# Patient Record
Sex: MALE | URBAN - METROPOLITAN AREA
[De-identification: names, ages, dates, MRNs, and addresses within clinical notes are randomized per-mention and may not be internally consistent; named-entity substitution may affect disease eponyms.]

---

## 2024-03-22 ENCOUNTER — TELEPHONE (OUTPATIENT)
Age: 29
End: 2024-03-22

## 2024-03-22 NOTE — TELEPHONE ENCOUNTER
Wife calling to book Pt an OV with , dose not want to wait til June or book with anyone else. She ended the call with put booking